# Patient Record
Sex: FEMALE | Race: WHITE | URBAN - METROPOLITAN AREA
[De-identification: names, ages, dates, MRNs, and addresses within clinical notes are randomized per-mention and may not be internally consistent; named-entity substitution may affect disease eponyms.]

---

## 2021-01-05 ENCOUNTER — OFFICE VISIT (OUTPATIENT)
Dept: OBGYN CLINIC | Facility: CLINIC | Age: 31
End: 2021-01-05
Payer: COMMERCIAL

## 2021-01-05 VITALS
SYSTOLIC BLOOD PRESSURE: 122 MMHG | HEART RATE: 87 BPM | BODY MASS INDEX: 26.87 KG/M2 | WEIGHT: 146 LBS | DIASTOLIC BLOOD PRESSURE: 78 MMHG | HEIGHT: 62 IN

## 2021-01-05 DIAGNOSIS — Z11.3 SCREEN FOR STD (SEXUALLY TRANSMITTED DISEASE): ICD-10-CM

## 2021-01-05 DIAGNOSIS — E66.3 OVERWEIGHT (BMI 25.0-29.9): ICD-10-CM

## 2021-01-05 DIAGNOSIS — L70.0 ACNE VULGARIS: ICD-10-CM

## 2021-01-05 DIAGNOSIS — Z12.4 SCREENING FOR CERVICAL CANCER: ICD-10-CM

## 2021-01-05 DIAGNOSIS — Z30.09 GENERAL COUNSELING AND ADVICE FOR CONTRACEPTIVE MANAGEMENT: ICD-10-CM

## 2021-01-05 DIAGNOSIS — L68.0 HIRSUTISM: ICD-10-CM

## 2021-01-05 DIAGNOSIS — Z01.419 WELL WOMAN EXAM: Primary | ICD-10-CM

## 2021-01-05 PROCEDURE — 3074F SYST BP LT 130 MM HG: CPT | Performed by: OBSTETRICS & GYNECOLOGY

## 2021-01-05 PROCEDURE — 99385 PREV VISIT NEW AGE 18-39: CPT | Performed by: OBSTETRICS & GYNECOLOGY

## 2021-01-05 PROCEDURE — 87491 CHLMYD TRACH DNA AMP PROBE: CPT | Performed by: OBSTETRICS & GYNECOLOGY

## 2021-01-05 PROCEDURE — 3078F DIAST BP <80 MM HG: CPT | Performed by: OBSTETRICS & GYNECOLOGY

## 2021-01-05 PROCEDURE — 87591 N.GONORRHOEAE DNA AMP PROB: CPT | Performed by: OBSTETRICS & GYNECOLOGY

## 2021-01-05 PROCEDURE — 88175 CYTOPATH C/V AUTO FLUID REDO: CPT | Performed by: OBSTETRICS & GYNECOLOGY

## 2021-01-05 PROCEDURE — 3008F BODY MASS INDEX DOCD: CPT | Performed by: OBSTETRICS & GYNECOLOGY

## 2021-01-05 PROCEDURE — 99204 OFFICE O/P NEW MOD 45 MIN: CPT | Performed by: OBSTETRICS & GYNECOLOGY

## 2021-01-05 RX ORDER — SPIRONOLACTONE 100 MG/1
100 TABLET, FILM COATED ORAL DAILY
COMMUNITY

## 2021-01-05 NOTE — H&P
[address] [facility]  Obstetrics and Gynecology   History & Physical  NEW    Chief complaint: Patient presents with:  Wellness Visit    Subjective:     HPI: July Garcia is a 35year old    Here for well woman exam.     Patient's last menstrual per 10/26/2015  No history of LEEP/conization. Mammogram - never   Colonoscopy - never  DEXA scan - never      PCP: No primary care provider on file. Review of Systems   Constitutional: Negative. Respiratory: Negative. Cardiovascular: Negative. nodule     reportedly 11 x 8 mm       PSH:  Past Surgical History:   Procedure Laterality Date   • WISDOM TEETH REMOVED      around 19-20        Social History:  Social History    Socioeconomic History      Marital status: Single      Spouse name: Not on f Neg    • Ovarian Cancer Neg    • Colon Cancer Neg    • Birth Defects Neg    • Genetic Disease Neg    • Diabetes Neg    • Infertility Neg    • Endometriosis Neg        Objective:      01/05/21  0832   BP: 122/78   Pulse: 87   Weight: 146 lb (66.2 kg)   Ashlee results found for: GLU, BUN, BUNCREA, CREATSERUM, ANIONGAP, GFR, GFRNAA, GFRAA, CA, OSMOCALC, ALKPHO, AST, ALT, ALKPHOS, BILT, TP, ALB, GLOBULT, GLOBULIN, AGRATIO, ALBGLOBRAT, NA, K, CL, CO2    No results found for: CHOLEST, TRIG, HDL, LDL, VLDL, TCHDLRATI -discussed can still use progestin only hormonal contraception  -progestin only OCP Slynd sample x 1 & coupon given. She'll think about it    Folic acid discussed. Carrier screening discussed & info given.      STD screening: GC/CT per patient request.

## 2021-01-05 NOTE — PATIENT INSTRUCTIONS
Suspicious for PCOS    -symptoms concerning for PCOS, ovulatory dysfunction   -discussed is a diagnosis of exclusion  -will start with hormonal & basic labwork (fasting, early morning)   -reviewed PCOS increases risk for diabetes & is a metabolic disorder,

## 2021-01-07 LAB
C TRACH DNA SPEC QL NAA+PROBE: NEGATIVE
N GONORRHOEA DNA SPEC QL NAA+PROBE: NEGATIVE